# Patient Record
Sex: FEMALE | Race: WHITE | NOT HISPANIC OR LATINO | Employment: UNEMPLOYED | URBAN - NONMETROPOLITAN AREA
[De-identification: names, ages, dates, MRNs, and addresses within clinical notes are randomized per-mention and may not be internally consistent; named-entity substitution may affect disease eponyms.]

---

## 2021-02-15 ENCOUNTER — HOSPITAL ENCOUNTER (INPATIENT)
Facility: HOSPITAL | Age: 21
LOS: 1 days | Discharge: HOME | End: 2021-02-16
Attending: OBSTETRICS & GYNECOLOGY | Admitting: OBSTETRICS & GYNECOLOGY

## 2021-02-15 PROBLEM — Z37.9 NORMAL LABOR: Status: ACTIVE | Noted: 2021-02-15

## 2021-02-15 LAB
ABO GROUP BLD: NORMAL
AMPHET+METHAMPHET UR QL: NEGATIVE
AMPHETAMINES UR QL: NEGATIVE
BARBITURATES UR QL SCN: NEGATIVE
BENZODIAZ UR QL SCN: NEGATIVE
BLD GP AB SCN SERPL QL: NEGATIVE
BUPRENORPHINE SERPL-MCNC: NEGATIVE NG/ML
CANNABINOIDS SERPL QL: NEGATIVE
COCAINE UR QL: NEGATIVE
DEPRECATED RDW RBC AUTO: 41.9 FL (ref 37–54)
ERYTHROCYTE [DISTWIDTH] IN BLOOD BY AUTOMATED COUNT: 12.9 % (ref 12.3–15.4)
HCT VFR BLD AUTO: 38.7 % (ref 34–46.6)
HGB BLD-MCNC: 13.8 G/DL (ref 12–15.9)
HOLD SPECIMEN: NORMAL
Lab: NORMAL
MCH RBC QN AUTO: 31.8 PG (ref 26.6–33)
MCHC RBC AUTO-ENTMCNC: 35.7 G/DL (ref 31.5–35.7)
MCV RBC AUTO: 89.2 FL (ref 79–97)
METHADONE UR QL SCN: NEGATIVE
OPIATES UR QL: NEGATIVE
OXYCODONE UR QL SCN: NEGATIVE
PCP UR QL SCN: NEGATIVE
PLATELET # BLD AUTO: 254 10*3/MM3 (ref 140–450)
PMV BLD AUTO: 11.2 FL (ref 6–12)
PROPOXYPH UR QL: NEGATIVE
RBC # BLD AUTO: 4.34 10*6/MM3 (ref 3.77–5.28)
RH BLD: POSITIVE
T&S EXPIRATION DATE: NORMAL
TRICYCLICS UR QL SCN: NEGATIVE
WBC # BLD AUTO: 20.43 10*3/MM3 (ref 3.4–10.8)

## 2021-02-15 PROCEDURE — 86901 BLOOD TYPING SEROLOGIC RH(D): CPT | Performed by: OBSTETRICS & GYNECOLOGY

## 2021-02-15 PROCEDURE — 25010000003 PENICILLIN G POTASSIUM PER 600000 UNITS: Performed by: OBSTETRICS & GYNECOLOGY

## 2021-02-15 PROCEDURE — 25010000002 BUTORPHANOL PER 1 MG: Performed by: OBSTETRICS & GYNECOLOGY

## 2021-02-15 PROCEDURE — 80306 DRUG TEST PRSMV INSTRMNT: CPT | Performed by: OBSTETRICS & GYNECOLOGY

## 2021-02-15 PROCEDURE — 85027 COMPLETE CBC AUTOMATED: CPT | Performed by: OBSTETRICS & GYNECOLOGY

## 2021-02-15 PROCEDURE — 86850 RBC ANTIBODY SCREEN: CPT | Performed by: OBSTETRICS & GYNECOLOGY

## 2021-02-15 PROCEDURE — 86900 BLOOD TYPING SEROLOGIC ABO: CPT | Performed by: OBSTETRICS & GYNECOLOGY

## 2021-02-15 RX ORDER — FERROUS SULFATE TAB EC 324 MG (65 MG FE EQUIVALENT) 324 (65 FE) MG
324 TABLET DELAYED RESPONSE ORAL 2 TIMES DAILY WITH MEALS
Status: DISCONTINUED | OUTPATIENT
Start: 2021-02-16 | End: 2021-02-16 | Stop reason: HOSPADM

## 2021-02-15 RX ORDER — BISACODYL 10 MG
10 SUPPOSITORY, RECTAL RECTAL DAILY PRN
Status: DISCONTINUED | OUTPATIENT
Start: 2021-02-16 | End: 2021-02-16 | Stop reason: HOSPADM

## 2021-02-15 RX ORDER — ONDANSETRON 2 MG/ML
4 INJECTION INTRAMUSCULAR; INTRAVENOUS EVERY 6 HOURS PRN
Status: DISCONTINUED | OUTPATIENT
Start: 2021-02-15 | End: 2021-02-15

## 2021-02-15 RX ORDER — CALCIUM CARBONATE 200(500)MG
2 TABLET,CHEWABLE ORAL 3 TIMES DAILY PRN
Status: DISCONTINUED | OUTPATIENT
Start: 2021-02-15 | End: 2021-02-16 | Stop reason: HOSPADM

## 2021-02-15 RX ORDER — ONDANSETRON 4 MG/1
4 TABLET, FILM COATED ORAL EVERY 6 HOURS PRN
Status: DISCONTINUED | OUTPATIENT
Start: 2021-02-15 | End: 2021-02-15

## 2021-02-15 RX ORDER — CARBOPROST TROMETHAMINE 250 UG/ML
250 INJECTION, SOLUTION INTRAMUSCULAR AS NEEDED
Status: DISCONTINUED | OUTPATIENT
Start: 2021-02-15 | End: 2021-02-16

## 2021-02-15 RX ORDER — LIDOCAINE HYDROCHLORIDE 10 MG/ML
5 INJECTION, SOLUTION EPIDURAL; INFILTRATION; INTRACAUDAL; PERINEURAL AS NEEDED
Status: DISCONTINUED | OUTPATIENT
Start: 2021-02-15 | End: 2021-02-15

## 2021-02-15 RX ORDER — OXYTOCIN/0.9 % SODIUM CHLORIDE 30/500 ML
85 PLASTIC BAG, INJECTION (ML) INTRAVENOUS ONCE
Status: DISCONTINUED | OUTPATIENT
Start: 2021-02-16 | End: 2021-02-16 | Stop reason: HOSPADM

## 2021-02-15 RX ORDER — IBUPROFEN 800 MG/1
800 TABLET ORAL EVERY 8 HOURS
Status: DISCONTINUED | OUTPATIENT
Start: 2021-02-15 | End: 2021-02-15

## 2021-02-15 RX ORDER — ONDANSETRON 2 MG/ML
4 INJECTION INTRAMUSCULAR; INTRAVENOUS EVERY 6 HOURS PRN
Status: DISCONTINUED | OUTPATIENT
Start: 2021-02-15 | End: 2021-02-16 | Stop reason: HOSPADM

## 2021-02-15 RX ORDER — LIDOCAINE HYDROCHLORIDE 10 MG/ML
INJECTION, SOLUTION EPIDURAL; INFILTRATION; INTRACAUDAL; PERINEURAL
Status: DISPENSED
Start: 2021-02-15 | End: 2021-02-16

## 2021-02-15 RX ORDER — DOCUSATE SODIUM 100 MG/1
100 CAPSULE, LIQUID FILLED ORAL 2 TIMES DAILY
Status: DISCONTINUED | OUTPATIENT
Start: 2021-02-16 | End: 2021-02-16 | Stop reason: HOSPADM

## 2021-02-15 RX ORDER — OXYTOCIN/0.9 % SODIUM CHLORIDE 30/500 ML
650 PLASTIC BAG, INJECTION (ML) INTRAVENOUS ONCE
Status: DISCONTINUED | OUTPATIENT
Start: 2021-02-15 | End: 2021-02-16 | Stop reason: HOSPADM

## 2021-02-15 RX ORDER — LANOLIN 100 %
OINTMENT (GRAM) TOPICAL
Status: DISCONTINUED | OUTPATIENT
Start: 2021-02-15 | End: 2021-02-16 | Stop reason: HOSPADM

## 2021-02-15 RX ORDER — MISOPROSTOL 200 UG/1
800 TABLET ORAL AS NEEDED
Status: DISCONTINUED | OUTPATIENT
Start: 2021-02-15 | End: 2021-02-15

## 2021-02-15 RX ORDER — PRENATAL VIT/IRON FUM/FOLIC AC 27MG-0.8MG
1 TABLET ORAL DAILY
Status: DISCONTINUED | OUTPATIENT
Start: 2021-02-16 | End: 2021-02-16 | Stop reason: HOSPADM

## 2021-02-15 RX ORDER — OXYTOCIN/0.9 % SODIUM CHLORIDE 30/500 ML
PLASTIC BAG, INJECTION (ML) INTRAVENOUS
Status: DISPENSED
Start: 2021-02-15 | End: 2021-02-16

## 2021-02-15 RX ORDER — SODIUM CHLORIDE, SODIUM LACTATE, POTASSIUM CHLORIDE, CALCIUM CHLORIDE 600; 310; 30; 20 MG/100ML; MG/100ML; MG/100ML; MG/100ML
INJECTION, SOLUTION INTRAVENOUS
Status: DISPENSED
Start: 2021-02-15 | End: 2021-02-16

## 2021-02-15 RX ORDER — ACETAMINOPHEN 500 MG
1000 TABLET ORAL EVERY 6 HOURS
Status: DISCONTINUED | OUTPATIENT
Start: 2021-02-16 | End: 2021-02-16 | Stop reason: HOSPADM

## 2021-02-15 RX ORDER — OXYTOCIN/0.9 % SODIUM CHLORIDE 30/500 ML
2 PLASTIC BAG, INJECTION (ML) INTRAVENOUS
Status: DISCONTINUED | OUTPATIENT
Start: 2021-02-15 | End: 2021-02-16

## 2021-02-15 RX ORDER — ACETAMINOPHEN 500 MG
1000 TABLET ORAL EVERY 6 HOURS
Status: DISCONTINUED | OUTPATIENT
Start: 2021-02-15 | End: 2021-02-15

## 2021-02-15 RX ORDER — PROMETHAZINE HYDROCHLORIDE 25 MG/1
25 TABLET ORAL EVERY 6 HOURS PRN
Status: DISCONTINUED | OUTPATIENT
Start: 2021-02-15 | End: 2021-02-15

## 2021-02-15 RX ORDER — METHYLERGONOVINE MALEATE 0.2 MG/ML
200 INJECTION INTRAVENOUS ONCE AS NEEDED
Status: DISCONTINUED | OUTPATIENT
Start: 2021-02-15 | End: 2021-02-15

## 2021-02-15 RX ORDER — SODIUM CHLORIDE 0.9 % (FLUSH) 0.9 %
3-10 SYRINGE (ML) INJECTION AS NEEDED
Status: DISCONTINUED | OUTPATIENT
Start: 2021-02-15 | End: 2021-02-15

## 2021-02-15 RX ORDER — BUTORPHANOL TARTRATE 1 MG/ML
1 INJECTION, SOLUTION INTRAMUSCULAR; INTRAVENOUS
Status: DISCONTINUED | OUTPATIENT
Start: 2021-02-15 | End: 2021-02-15

## 2021-02-15 RX ORDER — PROMETHAZINE HYDROCHLORIDE 12.5 MG/1
12.5 SUPPOSITORY RECTAL EVERY 6 HOURS PRN
Status: DISCONTINUED | OUTPATIENT
Start: 2021-02-15 | End: 2021-02-15

## 2021-02-15 RX ORDER — OXYTOCIN/0.9 % SODIUM CHLORIDE 30/500 ML
85 PLASTIC BAG, INJECTION (ML) INTRAVENOUS ONCE
Status: COMPLETED | OUTPATIENT
Start: 2021-02-15 | End: 2021-02-15

## 2021-02-15 RX ORDER — HYDROCORTISONE 25 MG/G
1 CREAM TOPICAL AS NEEDED
Status: DISCONTINUED | OUTPATIENT
Start: 2021-02-15 | End: 2021-02-16 | Stop reason: HOSPADM

## 2021-02-15 RX ORDER — OXYTOCIN/0.9 % SODIUM CHLORIDE 30/500 ML
650 PLASTIC BAG, INJECTION (ML) INTRAVENOUS ONCE
Status: DISCONTINUED | OUTPATIENT
Start: 2021-02-15 | End: 2021-02-16

## 2021-02-15 RX ORDER — SODIUM CHLORIDE, SODIUM LACTATE, POTASSIUM CHLORIDE, CALCIUM CHLORIDE 600; 310; 30; 20 MG/100ML; MG/100ML; MG/100ML; MG/100ML
125 INJECTION, SOLUTION INTRAVENOUS CONTINUOUS
Status: DISCONTINUED | OUTPATIENT
Start: 2021-02-15 | End: 2021-02-15

## 2021-02-15 RX ORDER — IBUPROFEN 800 MG/1
800 TABLET ORAL EVERY 8 HOURS
Status: DISCONTINUED | OUTPATIENT
Start: 2021-02-16 | End: 2021-02-16 | Stop reason: HOSPADM

## 2021-02-15 RX ORDER — SODIUM CHLORIDE 0.9 % (FLUSH) 0.9 %
1-10 SYRINGE (ML) INJECTION AS NEEDED
Status: DISCONTINUED | OUTPATIENT
Start: 2021-02-15 | End: 2021-02-16 | Stop reason: HOSPADM

## 2021-02-15 RX ORDER — SODIUM CHLORIDE 0.9 % (FLUSH) 0.9 %
3 SYRINGE (ML) INJECTION EVERY 12 HOURS SCHEDULED
Status: DISCONTINUED | OUTPATIENT
Start: 2021-02-15 | End: 2021-02-15

## 2021-02-15 RX ORDER — ONDANSETRON 4 MG/1
4 TABLET, FILM COATED ORAL EVERY 6 HOURS PRN
Status: DISCONTINUED | OUTPATIENT
Start: 2021-02-15 | End: 2021-02-16 | Stop reason: HOSPADM

## 2021-02-15 RX ADMIN — BUTORPHANOL TARTRATE 1 MG: 1 INJECTION, SOLUTION INTRAMUSCULAR; INTRAVENOUS at 20:10

## 2021-02-15 RX ADMIN — SODIUM CHLORIDE 5 MILLION UNITS: 9 INJECTION, SOLUTION INTRAVENOUS at 20:22

## 2021-02-15 RX ADMIN — SODIUM CHLORIDE, POTASSIUM CHLORIDE, SODIUM LACTATE AND CALCIUM CHLORIDE 125 ML/HR: 600; 310; 30; 20 INJECTION, SOLUTION INTRAVENOUS at 19:50

## 2021-02-15 RX ADMIN — IBUPROFEN 800 MG: 800 TABLET ORAL at 22:59

## 2021-02-15 RX ADMIN — OXYTOCIN-SODIUM CHLORIDE 0.9% IV SOLN 30 UNIT/500ML 2 MILLI-UNITS/MIN: 30-0.9/5 SOLUTION at 19:55

## 2021-02-15 RX ADMIN — OXYTOCIN-SODIUM CHLORIDE 0.9% IV SOLN 30 UNIT/500ML 85 ML/HR: 30-0.9/5 SOLUTION at 23:04

## 2021-02-15 RX ADMIN — LIDOCAINE HYDROCHLORIDE 10 ML: 10 INJECTION, SOLUTION EPIDURAL; INFILTRATION; INTRACAUDAL; PERINEURAL at 22:45

## 2021-02-15 RX ADMIN — ACETAMINOPHEN 1000 MG: 500 TABLET ORAL at 22:59

## 2021-02-16 VITALS
HEART RATE: 78 BPM | RESPIRATION RATE: 16 BRPM | OXYGEN SATURATION: 100 % | DIASTOLIC BLOOD PRESSURE: 65 MMHG | WEIGHT: 185 LBS | SYSTOLIC BLOOD PRESSURE: 112 MMHG | HEIGHT: 64 IN | BODY MASS INDEX: 31.58 KG/M2 | TEMPERATURE: 98.6 F

## 2021-02-16 PROCEDURE — 0UQGXZZ REPAIR VAGINA, EXTERNAL APPROACH: ICD-10-PCS | Performed by: OBSTETRICS & GYNECOLOGY

## 2021-02-16 PROCEDURE — 3E033VJ INTRODUCTION OF OTHER HORMONE INTO PERIPHERAL VEIN, PERCUTANEOUS APPROACH: ICD-10-PCS | Performed by: OBSTETRICS & GYNECOLOGY

## 2021-02-16 PROCEDURE — 6A550ZT PHERESIS OF CORD BLOOD STEM CELLS, SINGLE: ICD-10-PCS | Performed by: OBSTETRICS & GYNECOLOGY

## 2021-02-16 PROCEDURE — 0503F POSTPARTUM CARE VISIT: CPT | Performed by: OBSTETRICS & GYNECOLOGY

## 2021-02-16 PROCEDURE — 59410 OBSTETRICAL CARE: CPT | Performed by: OBSTETRICS & GYNECOLOGY

## 2021-02-16 RX ADMIN — IBUPROFEN 800 MG: 800 TABLET ORAL at 14:56

## 2021-02-16 NOTE — PLAN OF CARE
Goal Outcome Evaluation:  Plan of Care Reviewed With: patient, spouse  Progress: improving  Outcome Summary: VSS. baby latching well. bleeding is moderate now, will continue to monitor throughout the rest of the morning. patient ambulated well to the bathroom to void. ice packs used for perineum swelling

## 2021-02-16 NOTE — L&D DELIVERY NOTE
[facility]  Vaginal Delivery Note    Delivery     Delivery: Vaginal, Spontaneous     YOB: 2022    Time of Birth:  Gestational Age 10:31 PM   39w5d     Anesthesia: None     Delivering clinician: Chiquis Gonzales    Forceps?   No   Vacuum? No    Shoulder dystocia present: No        Delivery narrative:     Patient pushed to a deliver a viable 3790g male from the TITO position over an intact perineum via  without anesthesia on 2/15/2021 at [facility].  No noted nuchal cord.  Right anterior shoulder was delivered with ease, followed by left posterior shoulder.  Body was then delivered with gentle traction.  Mouth and nose bulb suctioned.  3 vessel cord clamped x2 and cut after 30 seconds of delayed clamping.  Baby was placed on mother's chest.  Cord blood was obtained obtained and sent.  Placenta delivered spontaneously intact and Pitocin was started.  Cervix, vagina, and perineum were examined and bilateral vaginal lacerations were noted.  3-0 vicryl was used to repair the laceration in a routine fashion with good hemostasis.  EBL 350cc; QBL pending, please see nursing documentation.  Apgar scores 9/9.  Mother and infant stable.    Infant    Findings: male  infant     Infant observations: Weight: 3790 g (8 lb 5.7 oz)   Length: 20.866  in  Observations/Comments:        Apgars: 9  @ 1 minute /    9  @ 5 minutes   Infant Name: Rohit     Placenta, Cord, and Fluid    Placenta delivered  Expressed  at   2/15/2021 10:33 PM     Cord: 3 vessels  present.   Nuchal Cord?  no   Cord blood obtained: Yes    Cord gases obtained:  No    Cord gas results: Venous:  No results found for: PHCVEN    Arterial:  No results found for: PHCART     Repair    Episiotomy: None     No    Lacerations: Yes  Laceration Information  Laceration Repaired?   Perineal: None      Periurethral:       Labial:       Sulcus:       Vaginal: Yes  Yes    Cervical:         Suture used for repair: 3-0 Vicryl   Estimated Blood Loss: Est.  Blood Loss (mL): 350 mL(Filed from Delivery Summary) (02/15/21 8850)         Complications  prolonged first stage    Disposition  Mother to Mother Baby/Postpartum  in stable condition currently.  Baby to NBN  in stable condition currently.    Chiquis Gonzales MD  02/16/21  01:15 CST

## 2021-02-16 NOTE — PROGRESS NOTES
"[facility]  Progress Note - Obstetrics    Name: Chiquis Sanchez  MRN: [id number]  Location: M756/1  Date: 2021  CSN: [id number]       PPD #1 s/p  at 39w5d with arrested labor     Patient seen and examined.  Denies headache, dizziness, chest pain, shortness of breath, nausea, vomiting.  Minimal/moderate lochia.  OOB and ambulating.  Tolerating regular diet.  Voiding without difficulty.  Breastfeeding.    PHYSICAL EXAM  /68 (BP Location: Left arm, Patient Position: Lying)   Pulse 83   Temp 98.5 °F (36.9 °C) (Oral)   Resp 18   Ht 162.6 cm (64\")   Wt 83.9 kg (185 lb)   SpO2 99%   Breastfeeding Yes   BMI 31.76 kg/m²   General: AAOx3, no apparent distress.  Lungs: CTA B/L anteriorly, no wheezes, rales, rhonchi.  CV: Acceptable rate, regular rhythm, S1/S2 normal.  Abdomen: +BS, soft, nondistended, uterine fundus firm, nontender, and below umbilicus.  Lower extremities: No bilateral edema.  2+/4+ dorsalis pedis pulses B/L.    IMPRESSION  Chiquis Sanchez is a 20 y.o.  PPD #1 s/p .  Doing well and recovering appropriately.  Desires discharge today.    PLAN  1.  Postpartum s/p   - Continue routine postpartum care.  - Diet: Pregnancy/breastfeeding  - DVT prophylaxis: SCDs  - Breastfeeding  - Birth control options were discussed and patient declines  - Discharge to home today.  Follow-up w/ lay midwife for postpartum follow-up.    This document has been electronically signed by Chiquis Gonzales MD on 2021 07:35 CST.  "

## 2021-02-16 NOTE — H&P
"HISTORY & PHYSICAL - Obstetrics  [facility]    Name: Chiquis Sanchez  MRN: [id number]  Location: L771/1  Date: 02/15/2021  CSN: [id number]      CHIEF COMPLAINT:  \"My labor stalled\"    HISTORY OF PRESENT ILLNESS  Chiquis Sanchez is a 20 y.o.  at 39w5d gestational age by LMP presents at the request of her lay midwife, Lynda Bernal, who had called stating that \"she has been 7 cm for hours and hours.\"  Per her labor log, she has been 7 cm since 2PM.  Per her , her water just broke a few hours ago.    Patient denies any chest pain, palpitations, headaches, lightheadedness, shortness of breath, cough, nausea, vomiting, diarrhea, constipation, fever, or chills.    ROS  Review of Systems   Constitutional: Negative.    HENT: Negative.    Eyes: Negative.    Respiratory: Negative.    Cardiovascular: Negative.    Gastrointestinal: Positive for abdominal pain.   Endocrine: Negative.    Genitourinary: Negative.    Musculoskeletal: Negative.    Skin: Negative.    Neurological: Negative.    Hematological: Negative.    Psychiatric/Behavioral: Negative.      PRENATAL LAB RESULTS  None    PRENATAL RISK FACTORS  1.  No PNC- Taoism    DATING SUMMARY  LMP (5/10/20) -- [place] 2021    OB HISTORY  OB History    Para Term  AB Living   1             SAB TAB Ectopic Molar Multiple Live Births                    # Outcome Date GA Lbr Kevyn/2nd Weight Sex Delivery Anes PTL Lv   1 Current              GYN HISTORY  Denies h/o sexually transmitted infections/pelvic inflammatory disease  Denies h/o gynecologic surgeries, including biopsies of the cervix    PAST MEDICAL HISTORY  Past Medical History:   Diagnosis Date   • No pertinent past medical history      PAST SURGICAL HISTORY  Past Surgical History:   Procedure Laterality Date   • NO PAST SURGERIES       FAMILY HISTORY  Family History   Problem Relation Age of Onset   • Bleeding Disorder Sister          age 4   • Bleeding Disorder Brother          " age 4     SOCIAL HISTORY  Social History     Socioeconomic History   • Marital status:      Spouse name: Not on file   • Number of children: Not on file   • Years of education: Not on file   • Highest education level: Not on file   Tobacco Use   • Smoking status: Never Smoker   Substance and Sexual Activity   • Alcohol use: Never     Frequency: Never   • Drug use: Never   • Sexual activity: Yes     Partners: Female     ALLERGIES  No Known Allergies    HOME MEDICATIONS  Prior to Admission medications    Not on File     PHYSICAL EXAM  There were no vitals taken for this visit.  General: No acute distress. Well developed, well nourished. Pleasant.  Heart: Regular rate and rhythm. No murmurs, rubs, or gallops  Lungs: Clear to auscultation bilaterally. No wheezes, rales, or rhonchi.  Abdomen: Soft, nontender to palpation, enlarged by gravid uterus.    NST Review  Indication: Term labor  FHT: Baseline 145 bpm, moderate variability, pos accelerations, neg decelerations.  [place]: Regular contractions every 5 minutes.  Impression: Cat 1 FHT    SVE: 90/+1/soft/ant    IMPRESSION  Chiquis Sanchez is a 20 y.o.  at 39w5d admitted with arrest of dilation.  Since reassuring FHTM will augment with pitocin and recommend position changes to promote labor.    PLAN  1.  IUP at 39w5d with arrest of dilation  - Admit: Labor and Delivery  - Attending: Dr. Gonzales  - Condition: Stable  - Vitals: per protocol  - Activity: ad pee  - Nursing: Continuous electronic fetal monitoring, as per protocol  - Diet: Clears  - IV fluids:  mL/hr  - Meds: Pitocin  - Allergies: NKDA  - Labs: CBC, T&S, UDS  - GBS: unk.  Antibiotics: PCN  - Chiquis Sanchez and I have discussed pain goals for this hospitalization after reviewing her current clinical condition, medical history and prior pain experiences.  The goal is to keep her pain level appropriate.  Patient does not currently desire an epidural.  I feel as though she would benefit from an epidural  as this would allow for her pelvis to relax to allow further fetal descent and cervical dilation.  - Anticipate     This document has been electronically signed by Chiquis Gonzales MD on February 15, 2021 19:36 CST.

## 2021-02-16 NOTE — DISCHARGE SUMMARY
[facility]  Discharge Summary  Patient Name: Chiquis Sanchez  : 2000  MRN: [id number]  CSN: [id number]    Discharge Summary    Date of Admission: 2/15/2021   Date of Discharge: 2021    Principle Discharge Dx: Active Hospital Problems    Diagnosis POA   • Single liveborn infant delivered vaginally [Z38.00] No   • Normal labor [O80, Z37.9] Not Applicable      Procedures Performed:    Brief History: Patient is a 20 y.o. now  who presented to labor and delivery at 39w5d with arrested labor at the request of her midwife, Lynda Bernal.   Hospital Course: Patient presented at 39w5d with arrested labor at the request of her midwife, Lynda Bernal.  Upon arrival she was 7 cm still.  She received pitocin for 2h then pushed for approximately 1.5 hours.  She had a .  She had vaginal lacerations, which were repaired, and a superficial perineal skin laceration which was not repaired as it was hemostatic.  Her postpartum course was unremarkable.  On PPD #1 she expressed the desire for discharge.  She had passed gas and was urinating normally.  She was eating a regular diet without difficulty.  She was ambulating well.  Discharge instructions were given.  All questions were answered   Condition:  Discharge Activity:  Discharge Diet: Stable  Activity Instructions     Bathing Restrictions      Type of Restriction: Bathing    Bathing Restrictions: Other    Explain Bathing Restrictions: No soaking in bathtub for 4 weeks. Showers are fine.    Driving Restrictions      Type of Restriction: Driving    Driving Restrictions: No Driving (Time Limited)    Length: Other    Indicate Length of Restriction: No driving for 1 week or if using narcotic pain medications. Riding is car is fine.    Lifting Restrictions      Type of Restriction: Lifting    Lifting Restrictions: Other    Explain Lifing Restrictions: No lifting more than infant and baby carrier together for 6 weeks.    Pelvic Rest       Nothing in the vagina for 6 weeks to include tampons, intercourse, or douching.    Sexual Activity Restrictions      Type of Restriction: Sex    Explain Sexual Activity Restrictions: No sexual intercourse for at least 6 weeks        Regular   Discharge Medications:    Your medication list      You have not been prescribed any medications.        Discharge Disposition: Home   Follow-up: No future appointments.  Follow-up with Lynda Bernal (lay midwife) within 1 week  Return to hospital/clinic with concerns     <30 minutes was spent with the patient on the day of discharge.    This document has been electronically signed by Chiquis Gonzales MD on February 16, 2021 07:54 CST.

## 2021-02-22 ENCOUNTER — TELEPHONE (OUTPATIENT)
Dept: LACTATION | Facility: HOSPITAL | Age: 21
End: 2021-02-22